# Patient Record
Sex: MALE | Race: WHITE | NOT HISPANIC OR LATINO | URBAN - NONMETROPOLITAN AREA
[De-identification: names, ages, dates, MRNs, and addresses within clinical notes are randomized per-mention and may not be internally consistent; named-entity substitution may affect disease eponyms.]

---

## 2018-10-18 NOTE — PATIENT DISCUSSION
DRY EYES : Discussed with patient the importance of keeping the eye moist and the symptoms associated with dry eyes including blurry vision, tearing, burning, and valente sensation. Advised patient to minimize use of any fans blowing directly on the face. Advised patient to continue with artificial tears 2-3 times daily.

## 2019-06-06 ENCOUNTER — IMPORTED ENCOUNTER (OUTPATIENT)
Dept: URBAN - NONMETROPOLITAN AREA CLINIC 1 | Facility: CLINIC | Age: 59
End: 2019-06-06

## 2019-06-06 PROBLEM — H25.13: Noted: 2019-06-06

## 2019-06-06 PROCEDURE — 92015 DETERMINE REFRACTIVE STATE: CPT

## 2019-06-06 PROCEDURE — 92310 CONTACT LENS FITTING OU: CPT

## 2019-06-06 PROCEDURE — 92014 COMPRE OPH EXAM EST PT 1/>: CPT

## 2019-06-06 NOTE — PATIENT DISCUSSION
Simple Myopia OD/Compound Myopic Astigmatism OS w/Presbyopia-  discussed findings w/patient-  new spectacle/CL Rx issued-  monitor yearly or prn; 's Notes: MR 6/6/2019DFE 6/6/2019

## 2020-06-11 ENCOUNTER — IMPORTED ENCOUNTER (OUTPATIENT)
Dept: URBAN - NONMETROPOLITAN AREA CLINIC 1 | Facility: CLINIC | Age: 60
End: 2020-06-11

## 2020-06-11 PROBLEM — H52.4: Noted: 2020-06-11

## 2020-06-11 PROBLEM — H25.13: Noted: 2020-06-11

## 2020-06-11 PROBLEM — H52.223: Noted: 2020-06-11

## 2020-06-11 PROCEDURE — 92310 CONTACT LENS FITTING OU: CPT

## 2020-06-11 PROCEDURE — 92015 DETERMINE REFRACTIVE STATE: CPT

## 2020-06-11 PROCEDURE — 92014 COMPRE OPH EXAM EST PT 1/>: CPT

## 2020-06-11 NOTE — PATIENT DISCUSSION
Compound Myopic Astigmatism OU w/Presbyopia-  discussed findings w/patient-  new spectacle/CL Rx issued-  monitor yearly or prnCataracts OU -  discussed findings w/patient-  no treatment indicated at this time -  UV protection recommended-  monitor yearly or prn; 's Notes: MR 6/11/2020DFE 6/11/2020

## 2022-04-09 ASSESSMENT — VISUAL ACUITY
OS_SC: 20/25
OS_SC: 20/20
OS_PH: 20/25
OS_SC: 20/20-
OD_SC: 20/20
OU_CC: 20/20-
OD_SC: 20/20-
OD_SC: 20/20
OS_SC: 20/25
OU_CC: J1+

## 2022-04-09 ASSESSMENT — TONOMETRY
OD_IOP_MMHG: 12
OS_IOP_MMHG: 12
OS_IOP_MMHG: 11
OD_IOP_MMHG: 12

## 2022-06-29 ENCOUNTER — COMPREHENSIVE EXAM (OUTPATIENT)
Dept: RURAL CLINIC 1 | Facility: CLINIC | Age: 62
End: 2022-06-29

## 2022-06-29 DIAGNOSIS — H52.223: ICD-10-CM

## 2022-06-29 DIAGNOSIS — H52.13: ICD-10-CM

## 2022-06-29 PROCEDURE — 92310 CONTACT LENS FITTING OU: CPT

## 2022-06-29 PROCEDURE — 92015 DETERMINE REFRACTIVE STATE: CPT

## 2022-06-29 PROCEDURE — 92014 COMPRE OPH EXAM EST PT 1/>: CPT

## 2022-06-29 ASSESSMENT — VISUAL ACUITY
OS_CC: 20/20
OD_CC: 20/20
OU_CC: 20/20
OD_CC: 20/20
OU_CC: 20/20
OS_CC: 20/20

## 2022-06-29 ASSESSMENT — TONOMETRY
OS_IOP_MMHG: 14
OD_IOP_MMHG: 14

## 2022-06-29 NOTE — PATIENT DISCUSSION
Compound Myopic Astigmatism OU w/Presbyopia-  discussed findings w/patient-  new spectacle/CL Rx issued-  monitor yearly or prnCataracts OU -  discussed findings w/patient-  no treatment indicated at this time -  UV protection recommended-  monitor yearly or prn; 's Notes: MR 6/11/2020DFE 6/11/2020.